# Patient Record
(demographics unavailable — no encounter records)

---

## 2024-12-13 NOTE — ASSESSMENT
[FreeTextEntry1] : Patient is a 71 yo man with history of afib (on Coumadin), hypothyroidism, chronic LE lymphedema, CKD, and cholelithiasis s/p perc cholecystostomy (~7 years ago) with drain still in place, presents as a transfer from [facility] for bronch with biopsy for RUL cavitary lesion.  He was treated for a bacterial pneumonia.  He had a bronchoscopy.  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  Case reports of treating M sulzgai with RIPE.  But also recommendation to use Macrolide based regimen.  https://www.the[employer] Agency[employer].com/journals/laninf/article/EMME9397-9213(21)47098-0/fulltext  I would Change RIPE to : Clarithromycin 500 mg po q 12 [id number] mg po daily Rifabutin 300 mg po daily (pt is on Eliquis-rifabutin may be better option than rifampin).  High risk for complication from iv aminoglycoside  Repeat sputum for afb (pt did not produce sputum in the hospital)- requestd 3 specimens Check cbc cmp Ophthalmology eval on ethambutol  Follow up in 4-6 weeks

## 2024-12-13 NOTE — CONSULT LETTER
[Dear  ___] : Dear  [unfilled], [Courtesy Letter:] : I had the pleasure of seeing your patient, [unfilled], in my office today. [Please see my note below.] : Please see my note below. [Sincerely,] : Sincerely, [FreeTextEntry2] : Dr Lebron Sabillon  [address] [place] [id number] [FreeTextEntry3] : Riaz Cortez MD

## 2024-12-13 NOTE — HISTORY OF PRESENT ILLNESS
Right total knee replacement with computer assisted navigation    Second Assist (Provided by OR to help position, retract, and close)    Adductor Canal Block with Spinal      Vendor   Depuy Epic[employer]       Depuy [employer] - Obtain CT Scan    Track:  1    Home Health:  [facility]         [FreeTextEntry1] : Patient is a 71 yo man with history of afib (on Coumadin), hypothyroidism, chronic LE lymphedema, CKD, and cholelithiasis s/p perc cholecystostomy (~7 years ago) with drain still in place, presents as a transfer from [facility] for bronch with biopsy for RUL cavitary lesion. He was initally treated for a bacterial pneumonia.  He had a bronchoscopy. A specimen was AFB positive-  treated with RIPE starting 8/8. Mycobacterium ID'd as mycobacterium Sulzgai- sensitivies now available.   Repeat CT shows improvement in nodules and ground glass. Cavity is stable.   Denies cough Denies fever Feels better

## 2024-12-13 NOTE — PHYSICAL EXAM
[General Appearance - Alert] : alert [General Appearance - In No Acute Distress] : in no acute distress [Sclera] : the sclera and conjunctiva were normal [Hearing Threshold Finger Rub Not [place]] : hearing was normal [Neck Appearance] : the appearance of the neck was normal [Exaggerated Use Of Accessory Muscles For Inspiration] : no accessory muscle use [Heart Rate And Rhythm] : heart rate was normal and rhythm regular [Heart Sounds] : normal S1 and S2 [Bowel Sounds] : normal bowel sounds [Skin Color & Pigmentation] : normal skin color and pigmentation [] : no rash [Oriented To Time, Place, And Person] : oriented to person, place, and time [Affect] : the affect was normal

## 2024-12-13 NOTE — DATA REVIEWED
[FreeTextEntry1] : EXAM: [id number] - CT CHEST  - ORDERED BY: JAMI BREWER   PROCEDURE DATE:  11/08/2024    INTERPRETATION:  CLINICAL INFORMATION: follow up cavitary lung lesion;  COMPARISON: CT chest from 8/27/2024.  CONTRAST/COMPLICATIONS: IV Contrast: None. Oral Contrast: None. Complications: None documented.  PROCEDURE: CT scan of the chest was obtained without intravenous contrast.  FINDINGS:  AIRWAYS/LUNGS/PLEURA: No endobronchial lesion. 4.0 cm right upper lobe cavitary lesion with 5 mm wall thickness previously measured 3.7 cm with 9 mm wall thickness. Extensive resolution prior bilateral patchy and nodular opacities with right-sided nodular opacities measuring up to 9 mm remaining, predominantly on the right, inclusive of a 1.4 cm thin-walled cavitary right lower lobe nodule, and many tiny clustered nodules.Bilateral calcified granulomas. Right upper lobe bronchiectasis. No pleural effusion.  MEDIASTINUM AND JAQUELINE: No lymphadenopathy.  HEART/VASCULATURE: Heart is mildly enlarged No pericardial effusion. There are aortic, aortic valve, and coronary artery calcifications.  VISUALIZED UPPER ABDOMEN: Cholecystostomy tube tip in the gallbladder lumen. Partially imaged renal stones measuring up to 1.5 cm and the left..  CHEST WALL AND BONES: Degenerative changes. Unremarkable soft tissues.  IMPRESSION: 4.0 cm right upper lobe cavitary lesion with 5 mm wall thickness previously measured 3.7 cm with 9 mm wall thickness. Extensive resolution prior bilateral patchy and nodular opacities with right-sided nodular opacities measuring up to 9 mm remaining, predominantly on the right, inclusive of a 1.4 cm thin-walled cavitary right lower lobe nodule, and many tiny clustered nodules. Continued follow-up CT recommended.  --- End of Report ---

## 2025-03-05 NOTE — ASSESSMENT
[FreeTextEntry1] : Patient is a 71 yo man with history of afib (on Coumadin), hypothyroidism, chronic LE lymphedema, CKD, and cholelithiasis s/p perc cholecystostomy (~7 years ago) with drain still in place, presents as a transfer from [facility] for bronch with biopsy for RUL cavitary lesion.  He was treated for a bacterial pneumonia.  He had a bronchoscopy.  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  Case reports of treating M sulzgai with RIPE. But also recommendation to use Macrolide based regimen. https://www.the[employer].com/journals/laninf/article/OIJR1552-6754(21)94968-0/fulltext  In December, he was changed to clarithromycin/ ethambutol/ rifabutin (less interaction with anticoagulant) He developed pancytopenia requiring hospitalization  When his counts recovered, we resumed azithromycin.  Then added ethambutol I recommended adding clofazamin.e  I explained that it is available under an investigational new drug protocol I discussed risks including skinhyperpigmentation and Qtc prolongation  Pt will return to reconsider/ consent if amenable.   Repeat sputum for afb (pt did not produce sputum in the hospital)- pt does not produce sputum Check cbc cmp Ophthalmology eval on ethambutol histoy of a fib. previosuly on eliquis. Off at this time- ? related to recent anemia. cardiology follow up   Follow up in 4-6 weeks.

## 2025-03-05 NOTE — PHYSICAL EXAM
[General Appearance - Alert] : alert [General Appearance - In No Acute Distress] : in no acute distress [Sclera] : the sclera and conjunctiva were normal [Outer Ear] : the ears and nose were normal in appearance [Neck Appearance] : the appearance of the neck was normal [] : no respiratory distress [Exaggerated Use Of Accessory Muscles For Inspiration] : no accessory muscle use [Heart Rate And Rhythm] : heart rate was normal and rhythm regular [No Palpable Adenopathy] : no palpable adenopathy [Bowel Sounds] : normal bowel sounds [Musculoskeletal - Swelling] : no joint swelling [Range of Motion to Joints] : range of motion to joints [Skin Color & Pigmentation] : normal skin color and pigmentation

## 2025-03-05 NOTE — HISTORY OF PRESENT ILLNESS
[FreeTextEntry1] : Patient is a 71 yo man with history of afib (on Coumadin), hypothyroidism, chronic LE lymphedema, CKD, and cholelithiasis s/p perc cholecystostomy (~7 years ago) with drain still in place, presents as a transfer from [facility] for bronch with biopsy for RUL cavitary lesion.  He was treated for a bacterial pneumonia.  He had a bronchoscopy.  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  Case reports of treating M sulzgai with RIPE. But also recommendation to use Macrolide based regimen. https://www.the[employer].com/journals/laninf/article/ATFB8424-4423(21)64403-5/fulltext  In December, he was changed to clarithromycin/ ethambutol/ rifabutin (less interaction with anticoagulant) He developed pancytopenia requiring hospitalization  When his counts recovered, we resumed azithromycin.  Then added ethambutol  Now presents for follow up.  Fells welll.  No cough

## 2025-03-13 NOTE — HISTORY OF PRESENT ILLNESS
[TextBox_4] : 73-year-old male with cavitary lung disease and scattered bronchiectasis with culture positive Mycobacterium szulgai treated with rifabutin, ethambutol and clarithromycin but developed pancytopenia thought to be due to rifabutin.  Patient currently on azithromycin and ethambutol.  Clofazimine is being considered.  The patient has multiple comorbidities including a prior history of rhabdomyolysis, renal stones, severe mitral regurgitation and indwelling percutaneous drainage of his gallbladder.  Cholecystectomy was not performed presumably due to his cardiac disease.  The patient reports that he feels reasonably well.  He uses a walker to get around but denies any dyspnea, cough or chest discomfort.  His appetite is satisfactory.  He is 20 pounds less than he was last year at this time

## 2025-03-13 NOTE — REVIEW OF SYSTEMS
[Anemia] : anemia [Negative] : Endocrine [TextBox_30] : As in history of present illness [TextBox_44] : As in history of present illness [TextBox_69] : As in history of present illness

## 2025-03-13 NOTE — REASON FOR VISIT
[Follow-Up] : a follow-up visit [Bronchiectasis] : bronchiectasis [Family Member] : family member [TextBox_44] : Mycobacterium nilo

## 2025-03-13 NOTE — ASSESSMENT
[FreeTextEntry1] : Most of his disease is centered around his right upper lobe although there is scattered bronchiectasis in the right lower lobe.  Comparing his last CT scan with his first CAT scan.  There has clearly been improvement in the right lower lobe infiltrate and in the kamilla-cavitary consolidation and pleural disease.  The cavity is also much thinner walled and its enlargement may reflect air trapping.  This improvement has occurred despite on again off again medications.  When asked why he did not have the gallbladder surgery, the patient stated that the surgeon told him he would die on the table because of his heart disease.  Cardiology was saw him in the hospital did not think so.  He has severe mitral regurgitation but cardiac function appears reasonably normal. I encouraged him to think about starting clofazimine.  Rifampin and rifabutin are not available to him because of the pancytopenia. Whether surgery is indicated for his right upper lobe disease is moot at this point since he refuses to have surgery for his gallbladder.  I discussed this with him in detail and told him should he ever reconsider surgery, I would think he would have his gallbladder removed first and if he tolerated that procedure then we could consider surgery for his right upper lung cavitary lesion.  To date there has been some improvement in both the right upper lobe and right lower lobe disease.

## 2025-03-13 NOTE — PHYSICAL EXAM
[Normal Appearance] : normal appearance [Normal Rate/Rhythm] : normal rate/rhythm [No Resp Distress] : no resp distress [No Clubbing] : no clubbing [No Edema] : no edema [Oriented x3] : oriented x3 [TextBox_2] : Frail, chronically ill [TextBox_54] : 3/6 systolic regurgitant murmur left sternal border to apex [TextBox_68] : Bronchial breathing right apex [TextBox_99] : Uses walker [TextBox_140] : Has difficulty hearing

## 2025-03-14 NOTE — ASSESSMENT
[FreeTextEntry1] : Patient is a 74 yo man with history of afib (on Coumadin), hypothyroidism, chronic LE lymphedema, CKD, and cholelithiasis s/p perc cholecystostomy (~7 years ago) with drain still in place, presents as a transfer from [facility] for bronch with biopsy for RUL cavitary lesion.  He was treated for a bacterial pneumonia.  He had a bronchoscopy.  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  Case reports of treating M szulgai with RIPE. But also recommendation to use Macrolide based regimen. https://www.[employer].com/journals/laninf/article/FJSL0507-6805(21)56992-8/fulltext  In December, he was changed to clarithromycin/ ethambutol/ rifabutin (less interaction with anticoagulant) He developed pancytopenia requiring hospitalization  When his counts recovered, we resumed azithromycin.  Then added ethambutol Recommended adding clofazimine  I explained that it is available under an investigational new drug protocol I discussed risks including skin hyperpigmentation and Qtc prolongation  Pt signed consent today for clofazimine.  Repeat sputum for afb when able (pt did not produce sputum in the hospital)- pt does not produce sputum Check cbc cmp each visit. Pt's brother would like to check labs about 2 weeks after starting clofaz. This seem very reasonable.  Ophthalmology eval on ethambutol Encouraged to stay hydrated. Reviewed labs with him. History of a fib. Previosuly on eliquis. Off at this time- ? related to recent anemia. Cardiology follow up   Follow up in 4-6 weeks.  Pt will need to come in to  clofazimine. May align with next visit.

## 2025-03-14 NOTE — PHYSICAL EXAM
[General Appearance - Alert] : alert [General Appearance - In No Acute Distress] : in no acute distress [Sclera] : the sclera and conjunctiva were normal [Outer Ear] : the ears and nose were normal in appearance [Neck Appearance] : the appearance of the neck was normal [] : no respiratory distress [Exaggerated Use Of Accessory Muscles For Inspiration] : no accessory muscle use [Heart Rate And Rhythm] : heart rate was normal and rhythm regular [Bowel Sounds] : normal bowel sounds [No Palpable Adenopathy] : no palpable adenopathy [Musculoskeletal - Swelling] : no joint swelling [Range of Motion to Joints] : range of motion to joints [Skin Color & Pigmentation] : normal skin color and pigmentation

## 2025-03-14 NOTE — HISTORY OF PRESENT ILLNESS
[FreeTextEntry1] : Patient is a 72 yo man with history of afib (on Coumadin), hypothyroidism, chronic LE lymphedema, CKD, and cholelithiasis s/p perc cholecystostomy (~7 years ago) with drain still in place, presents as a transfer from [facility] for bronch with biopsy for RUL cavitary lesion.  He was treated for a bacterial pneumonia.  He had a bronchoscopy.  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  Case reports of treating M szulgai with RIPE. But also recommendation to use Macrolide based regimen. https://www.the[employer].com/journals/laninf/article/VOWZ0720-3291(21)40405-0/fulltext  In December, he was changed to clarithromycin/ ethambutol/ rifabutin (less interaction with anticoagulant) He developed pancytopenia requiring hospitalization  When his counts recovered, we resumed azithromycin.  Then added ethambutol  Now presents for follow up.  Fells welll.  No cough  3/14/25: Here today to discuss obtaining clofazimine. Needs 3rd drug for his treatment. Unable to tolerate rifampin/rifabutin. Agreeable to clofazimine.  EKG reviewed and signed. Side effects discussed with pt and his brother.

## 2025-04-23 NOTE — ASSESSMENT
[FreeTextEntry1] : Patient is a 74 yo man with history of afib (on Coumadin), hypothyroidism, chronic LE lymphedema, CKD, and cholelithiasis s/p perc cholecystostomy (~7 years ago) with drain still in place, presents as a transfer from [facility] for bronch with biopsy for RUL cavitary lesion.  He was treated for a bacterial pneumonia.  He had a bronchoscopy.  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  Case reports of treating M szulgai with RIPE. But also recommendation to use Macrolide based regimen. https://www.the[employer].com/journals/laninf/article/URQF6583-6127(21)59123-5/fulltext  In December, he was changed to clarithromycin/ ethambutol/ rifabutin (less interaction with anticoagulant) He developed pancytopenia requiring hospitalization  When his counts recovered, we resumed azithromycin. Then added ethambutol Now on Azithromycin,ethambutol, and clofazimine  Ethambutol was dose adjusted for his renal function.  I explained that it is available under an investigational new drug protocol I discussed risks including skin hyperpigmentation and Qtc prolongation  Ophthalmology eval on ethambutol- pt has not made an appt yet. i reiterated the importance.  also stressed immediate follow up with any visual change.   Encouraged to stay hydrated. History of a fib. Previously on eliquis. Off at this time- ? related to recent anemia. Cardiology follow up  Repeat labs today and again around 5/7  Follow up in 4-6 weeks. Pt will need to come in to  clofazimine.   Next appt is May 2oth.

## 2025-04-23 NOTE — PHYSICAL EXAM
[General Appearance - Alert] : alert [General Appearance - In No Acute Distress] : in no acute distress [Sclera] : the sclera and conjunctiva were normal [Outer Ear] : the ears and nose were normal in appearance [Neck Appearance] : the appearance of the neck was normal [Neck Cervical Mass (___cm)] : no neck mass was observed [Exaggerated Use Of Accessory Muscles For Inspiration] : no accessory muscle use [Heart Sounds] : normal S1 and S2 [Heart Rate And Rhythm] : heart rate was normal and rhythm regular [Bowel Sounds] : normal bowel sounds [No Palpable Adenopathy] : no palpable adenopathy [Musculoskeletal - Swelling] : no joint swelling [Skin Color & Pigmentation] : normal skin color and pigmentation [] : no rash [Oriented To Time, Place, And Person] : oriented to person, place, and time [Affect] : the affect was normal

## 2025-04-23 NOTE — HISTORY OF PRESENT ILLNESS
[FreeTextEntry1] : 73 year old gentleman with cavitary lung disease. Diagnosed with M. Sulzgai  Previously treated with rifampin/azithromycin/ethambutol--complicated by cytopenia  Now on Azithromycin/ ethambutol/ clofazamine.  He denies palpitations He denies change in his skin. No fever No cough  He denies visual changes.

## 2025-05-23 NOTE — HISTORY OF PRESENT ILLNESS
[FreeTextEntry1] : Patient is a 74 yo man with history of afib (on Coumadin), hypothyroidism, chronic LE lymphedema, CKD, and cholelithiasis s/p perc cholecystostomy (~7 years ago) with drain still in place, presents as a transfer from [facility] for bronch with biopsy for RUL cavitary lesion.  He was treated for a bacterial pneumonia.  He had a bronchoscopy.  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  Case reports of treating M szulgai with RIPE. But also recommendation to use Macrolide based regimen. https://www.[employer].com/journals/laninf/article/WOOU3239-4056(21)76552-9/fulltext  In December, he was changed to clarithromycin/ ethambutol/ rifabutin (less interaction with anticoagulant) He developed pancytopenia requiring hospitalization  When his counts recovered, we resumed azithromycin.  Then added ethambutol  Now presents for follow up.  Fells welll.  No cough  3/14/25: Here today to discuss obtaining clofazimine. Needs 3rd drug for his treatment. Unable to tolerate rifampin/rifabutin. Agreeable to clofazimine.  EKG reviewed and signed. Side effects discussed with pt and his brother.   4/8/25: Doing well. Started clofaz. No side effects. Vision stable. Creatinine rising slowly on last blood work.   5/20/25: Doing well. Feels fine today. No side effects noted from meds.  For blood work today.  Not able to produce sputum.

## 2025-05-23 NOTE — ASSESSMENT
[FreeTextEntry1] : Patient is a 72 yo man with history of afib (on Coumadin), hypothyroidism, chronic LE lymphedema, CKD, and cholelithiasis s/p perc cholecystostomy (~7 years ago) with drain still in place, presented to [facility], as a transfer from [facility] for bronch with biopsy for RUL cavitary lesion.  Seen by my associate.   He was treated for a bacterial pneumonia.  He had a bronchoscopy.  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  Case reports of treating M szulgai with RIPE. But also recommendation to use Macrolide based regimen. https://www.the[employer].com/journals/laninf/article/OCSF9187-4947(21)73162-0/fulltext  In December, he was changed to clarithromycin/ ethambutol/ rifabutin (less interaction with anticoagulant) He developed pancytopenia requiring hospitalization  When his counts recovered, we resumed azithromycin.  Then added ethambutol Recommended adding clofazimine  I explained that it is available under an investigational new drug protocol I discussed risks including skin hyperpigmentation and Qtc prolongation  Pt signed consent for clofazimine.  Pt to stay on Azithro 500 mg daily, Ethambutol 1200 mg daily, Clofaz 100 mg daily. Repeat sputum for afb when able (pt did not produce sputum in the hospital)- pt does not produce sputum Check cbc cmp each visit. Pt's brother would like to check labs about 2 weeks after starting clofaz. This seem very reasonable.  Ophthalmology eval on ethambutol Encouraged to stay hydrated. Reviewed labs with him. History of a fib. Previosuly on eliquis. Off at this time- ? related to recent anemia. Cardiology follow up  Informed pt if creatinine rises more, will need to return to pcp and urology and lower ethambutol dose. Likely plan for ct chest 1 year from last 11/2025.  RTC  1 month. Labs in 2 weeks.

## 2025-05-23 NOTE — ASSESSMENT
[FreeTextEntry1] : Patient is a 72 yo man with history of afib (on Coumadin), hypothyroidism, chronic LE lymphedema, CKD, and cholelithiasis s/p perc cholecystostomy (~7 years ago) with drain still in place, presented to [facility], as a transfer from [facility] for bronch with biopsy for RUL cavitary lesion.  Seen by my associate.   He was treated for a bacterial pneumonia.  He had a bronchoscopy.  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  Case reports of treating M szulgai with RIPE. But also recommendation to use Macrolide based regimen. https://www.the[employer].com/journals/laninf/article/IYBM8066-2703(21)68071-6/fulltext  In December, he was changed to clarithromycin/ ethambutol/ rifabutin (less interaction with anticoagulant) He developed pancytopenia requiring hospitalization  When his counts recovered, we resumed azithromycin.  Then added ethambutol Recommended adding clofazimine  I explained that it is available under an investigational new drug protocol I discussed risks including skin hyperpigmentation and Qtc prolongation  Pt signed consent for clofazimine.  Pt to stay on Azithro 500 mg daily, Ethambutol 1200 mg daily, Clofaz 100 mg daily. Repeat sputum for afb when able (pt did not produce sputum in the hospital)- pt does not produce sputum Check cbc cmp each visit. Pt's brother would like to check labs about 2 weeks after starting clofaz. This seem very reasonable.  Ophthalmology eval on ethambutol Encouraged to stay hydrated. Reviewed labs with him. History of a fib. Previosuly on eliquis. Off at this time- ? related to recent anemia. Cardiology follow up  Informed pt if creatinine rises more, will need to return to pcp and urology and lower ethambutol dose. Likely plan for ct chest 1 year from last 11/2025.  RTC  1 month. Labs in 2 weeks.

## 2025-05-23 NOTE — PHYSICAL EXAM
[General Appearance - Alert] : alert [General Appearance - In No Acute Distress] : in no acute distress [Sclera] : the sclera and conjunctiva were normal [Outer Ear] : the ears and nose were normal in appearance [Neck Appearance] : the appearance of the neck was normal [] : no respiratory distress [Exaggerated Use Of Accessory Muscles For Inspiration] : no accessory muscle use [Heart Rate And Rhythm] : heart rate was normal and rhythm regular [Bowel Sounds] : normal bowel sounds [No Palpable Adenopathy] : no palpable adenopathy [Musculoskeletal - Swelling] : no joint swelling [Range of Motion to Joints] : range of motion to joints [Skin Color & Pigmentation] : normal skin color and pigmentation [FreeTextEntry1] : phyllis 16/16 b/l

## 2025-05-23 NOTE — HISTORY OF PRESENT ILLNESS
[FreeTextEntry1] : Patient is a 72 yo man with history of afib (on Coumadin), hypothyroidism, chronic LE lymphedema, CKD, and cholelithiasis s/p perc cholecystostomy (~7 years ago) with drain still in place, presents as a transfer from [facility] for bronch with biopsy for RUL cavitary lesion.  He was treated for a bacterial pneumonia.  He had a bronchoscopy.  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  Case reports of treating M szulgai with RIPE. But also recommendation to use Macrolide based regimen. https://www.[employer].com/journals/laninf/article/YQSY4656-9259(21)42120-8/fulltext  In December, he was changed to clarithromycin/ ethambutol/ rifabutin (less interaction with anticoagulant) He developed pancytopenia requiring hospitalization  When his counts recovered, we resumed azithromycin.  Then added ethambutol  Now presents for follow up.  Fells welll.  No cough  3/14/25: Here today to discuss obtaining clofazimine. Needs 3rd drug for his treatment. Unable to tolerate rifampin/rifabutin. Agreeable to clofazimine.  EKG reviewed and signed. Side effects discussed with pt and his brother.   4/8/25: Doing well. Started clofaz. No side effects. Vision stable. Creatinine rising slowly on last blood work.   5/20/25: Doing well. Feels fine today. No side effects noted from meds.  For blood work today.  Not able to produce sputum.

## 2025-05-23 NOTE — PHYSICAL EXAM
----- Message from Chris Morin sent at 2018  3:13 PM CDT -----  Contact: Patient  Angelika Mata  MRN: [id number]  : 1993  PCP: Angelika Armstrong  Home Phone      [phone]  Work Phone      Not on file.  Mobile          [phone]      MESSAGE: requesting refill on Cymbalta 60 mg -- uses [facility] ([address])    Call [phone]    PCP: Patti   [General Appearance - Alert] : alert [General Appearance - In No Acute Distress] : in no acute distress [Sclera] : the sclera and conjunctiva were normal [Outer Ear] : the ears and nose were normal in appearance [Neck Appearance] : the appearance of the neck was normal [] : no respiratory distress [Exaggerated Use Of Accessory Muscles For Inspiration] : no accessory muscle use [Heart Rate And Rhythm] : heart rate was normal and rhythm regular [Bowel Sounds] : normal bowel sounds [No Palpable Adenopathy] : no palpable adenopathy [Musculoskeletal - Swelling] : no joint swelling [Range of Motion to Joints] : range of motion to joints [Skin Color & Pigmentation] : normal skin color and pigmentation [FreeTextEntry1] : phyllis 16/16 b/l

## 2025-06-20 NOTE — HISTORY OF PRESENT ILLNESS
[FreeTextEntry1] : Patient is a 74 yo man with history of afib (on Coumadin), hypothyroidism, chronic LE lymphedema, CKD, and cholelithiasis s/p perc cholecystostomy (~7 years ago) with drain still in place, presents as a transfer from [facility] for bronch with biopsy for RUL cavitary lesion.  He was treated for a bacterial pneumonia.  He had a bronchoscopy.  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  Case reports of treating M szulgai with RIPE. But also recommendation to use Macrolide based regimen. https://www.[employer].com/journals/laninf/article/DMSX1160-8561(21)49537-3/fulltext  In December, he was changed to clarithromycin/ ethambutol/ rifabutin (less interaction with anticoagulant) He developed pancytopenia requiring hospitalization  When his counts recovered, we resumed azithromycin.  Then added ethambutol  Now presents for follow up.  Fells welll.  No cough  3/14/25: Here today to discuss obtaining clofazimine. Needs 3rd drug for his treatment. Unable to tolerate rifampin/rifabutin. Agreeable to clofazimine.  EKG reviewed and signed. Side effects discussed with pt and his brother.   4/8/25: Doing well. Started clofaz. No side effects. Vision stable. Creatinine rising slowly on last blood work.   5/20/25: Doing well. Feels fine today. No side effects noted from meds.  For blood work today.  Not able to produce sputum.  6/20/25: Doing well. Doesn't miss doses of meds. No side effects noted. Labs stable Not able to give sputum sample.

## 2025-06-20 NOTE — ASSESSMENT
[FreeTextEntry1] : Patient is a 72 yo man with history of afib (on Coumadin), hypothyroidism, chronic LE lymphedema, CKD, and cholelithiasis s/p perc cholecystostomy (~7 years ago) with drain still in place, presented to [facility], as a transfer from [facility] for bronch with biopsy for RUL cavitary lesion.  Seen by my associate.   He was treated for a bacterial pneumonia.  He had a bronchoscopy.  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  Case reports of treating M szulgai with RIPE. But also recommendation to use Macrolide based regimen. https://www.the[employer].com/journals/laninf/article/CEXQ7380-0602(21)85243-1/fulltext  In December, he was changed to clarithromycin/ ethambutol/ rifabutin (less interaction with anticoagulant) He developed pancytopenia requiring hospitalization  When his counts recovered, we resumed azithromycin.  Then added ethambutol Recommended adding clofazimine  I explained that it is available under an investigational new drug protocol I discussed risks including skin hyperpigmentation and Qtc prolongation  Pt signed consent for clofazimine, started 3/2025.  Pt to stay on Azithro 500 mg daily, Ethambutol 1200 mg daily, Clofaz 100 mg daily. Repeat sputum for afb when able (pt did not produce sputum in the hospital)- pt does not produce sputum Check cbc cmp each visit.  Ophthalmology eval on ethambutol. Ishihara stable, normal.  Encouraged to stay hydrated. Reviewed labs with him. History of Afib. Previosuly on eliquis. Off at this time- ? related to recent anemia. Cardiology follow up  Informed pt if creatinine rises more, will need to return to pcp and urology and lower ethambutol dose. Likely plan for ct chest 1 year from last 11/2025.  RTC  3 weeks for labs and MD visit.

## 2025-07-08 NOTE — ASSESSMENT
[FreeTextEntry1] : Patient is a 74 yo man with history of afib (on Coumadin), hypothyroidism, chronic LE lymphedema, CKD, and cholelithiasis s/p perc cholecystostomy (~7 years ago) with drain still in place, presented to [facility], as a transfer from [facility] for bronch with biopsy for RUL cavitary lesion.  Seen by my associate.   He was treated for a bacterial pneumonia.  He had a bronchoscopy.  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  Case reports of treating M szulgai with RIPE. But also recommendation to use Macrolide based regimen. https://www.the[employer].com/journals/laninf/article/WJFD6953-3176(21)30084-2/fulltext  In December, he was changed to clarithromycin/ ethambutol/ rifabutin (less interaction with anticoagulant) He developed pancytopenia requiring hospitalization  When his counts recovered, we resumed azithromycin.  Then added ethambutol Recommended adding clofazimine  I explained that it is available under an investigational new drug protocol I discussed risks including skin hyperpigmentation and Qtc prolongation  Pt signed consent for clofazimine, started 3/2025.  Pt to stay on Azithro 500 mg daily, Ethambutol 1200 mg daily, Clofaz 100 mg daily. Repeat sputum for afb when able (pt did not produce sputum in the hospital)- pt does not produce sputum Check cbc cmp each visit.  Check thyroid studies today too.  Ophthalmology eval on ethambutol. Ishihara stable, normal.  Encouraged to stay hydrated. Reviewed labs with him. History of Afib. Previosuly on eliquis. Off at this time- ? related to recent anemia. Cardiology follow up  Informed pt if creatinine rises more, will need to return to pcp and urology and lower ethambutol dose. Likely plan for ct chest 1 year from last 11/2025. Duration of antibiotics are unclear. I would like to see negative sputum sample if able. If not able may have to give meds one year thru 3/2025 from starting clofaz and then consider stopping.   RTC  3 weeks for labs and MD visit.

## 2025-07-08 NOTE — HISTORY OF PRESENT ILLNESS
[FreeTextEntry1] : Patient is a 72 yo man with history of afib (on Coumadin), hypothyroidism, chronic LE lymphedema, CKD, and cholelithiasis s/p perc cholecystostomy (~7 years ago) with drain still in place, presents as a transfer from [facility] for bronch with biopsy for RUL cavitary lesion.  He was treated for a bacterial pneumonia.  He had a bronchoscopy.  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  Case reports of treating M szulgai with RIPE. But also recommendation to use Macrolide based regimen. https://www.[employer].com/journals/laninf/article/ACMK9242-6784(21)57092-6/fulltext  In December, he was changed to clarithromycin/ ethambutol/ rifabutin (less interaction with anticoagulant) He developed pancytopenia requiring hospitalization  When his counts recovered, we resumed azithromycin.  Then added ethambutol  Now presents for follow up.  Fells welll.  No cough  3/14/25: Here today to discuss obtaining clofazimine. Needs 3rd drug for his treatment. Unable to tolerate rifampin/rifabutin. Agreeable to clofazimine.  EKG reviewed and signed. Side effects discussed with pt and his brother.   4/8/25: Doing well. Started clofaz. No side effects. Vision stable. Creatinine rising slowly on last blood work.   5/20/25: Doing well. Feels fine today. No side effects noted from meds.  For blood work today.  Not able to produce sputum.  6/20/25: Doing well. Doesn't miss doses of meds. No side effects noted. Labs stable Not able to give sputum sample.   7/8/25: Doing well. No new events. Vision stable No side effects

## 2025-07-30 NOTE — ASSESSMENT
[FreeTextEntry1] : Patient is a 72 yo man with history of afib (on Coumadin), hypothyroidism, chronic LE lymphedema, CKD, and cholelithiasis s/p perc cholecystostomy (~7 years ago) with drain still in place, presented to [facility], as a transfer from [facility] for bronch with biopsy for RUL cavitary lesion. Seen by my associate.  He was treated for a bacterial pneumonia.  He had a bronchoscopy.  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  Case reports of treating M szulgai with RIPE. But also recommendation to use Macrolide based regimen. https://www.the[employer].com/journals/laninf/article/XTJU4487-2680(21)64939-5/fulltext  In December, he was changed to clarithromycin/ ethambutol/ rifabutin (less interaction with anticoagulant) He developed pancytopenia requiring hospitalization  When his counts recovered, we resumed azithromycin. Then added ethambutol Recommended adding clofazimine  I explained that it is available under an investigational new drug protocol I discussed risks including skin hyperpigmentation and Qtc prolongation  Pt signed consent for clofazimine, started 3/2025.  Pt to stay on Azithro 500 mg daily, Ethambutol 1200 mg daily, Clofaz 100 mg daily. Repeat sputum for afb when able (pt did not produce sputum in the hospital)- pt does not produce sputum  Check cbc cmp each visit.  Ophthalmology eval on ethambutol. Ishihara stable, normal.  Encouraged to stay hydrated. Reviewed labs with him.  History of Afib. Previosuly on eliquis. Off at this time- ? related to recent anemia. Cardiology follow up Informed pt if creatinine rises more, will need to return to pcp and urology and lower ethambutol dose.  Likely plan for ct chest 1 year from last 11/2025.  Pt has pmd. He has been advised in past to follow up with cardiology and nephrology.  He struggles with frequent doctors visits.  With regard to CKD, follow up with pmd.  Continue to discuss nephrology eval.   Duration of antibiotics are unclear. I would like to see negative sputum sample if able. If not able may have to give meds one year thru 3/2025 from starting clofaz and then consider stopping.  RTC 4 weeks for labs and MD visit.

## 2025-07-30 NOTE — PHYSICAL EXAM
[General Appearance - Alert] : alert [Sclera] : the sclera and conjunctiva were normal [Extraocular Movements] : extraocular movements were intact [Outer Ear] : the ears and nose were normal in appearance [Hearing Threshold Finger Rub Not Becker] : hearing was normal [Neck Appearance] : the appearance of the neck was normal [] : no respiratory distress [Heart Rate And Rhythm] : heart rate was normal and rhythm regular [Heart Sounds] : normal S1 and S2 [Bowel Sounds] : normal bowel sounds [Abdomen Soft] : soft [Skin Color & Pigmentation] : normal skin color and pigmentation [Oriented To Time, Place, And Person] : oriented to person, place, and time

## 2025-07-30 NOTE — HISTORY OF PRESENT ILLNESS
[FreeTextEntry1] : Patient is a 74 yo man with history of afib (on Coumadin), hypothyroidism, chronic LE lymphedema, CKD, and cholelithiasis s/p perc cholecystostomy (~7 years ago) with drain still in place, presented to [facility] as a transfer from [facility] for bronch with biopsy for RUL cavitary lesion.  Diagnosed with mycobacterium sulzgai  had tx with RIPE---> then clarithromycin/ rifapampin/ ehtomabutol. developed cytopenia  Now on tx with azithromycin/ ethambutol/ Clofazamine  No palpitation no cahnge in skin color  No change in vision/color vision.  No n/v  Denies sob/ cough  weight has been stable .